# Patient Record
Sex: MALE | Race: WHITE
[De-identification: names, ages, dates, MRNs, and addresses within clinical notes are randomized per-mention and may not be internally consistent; named-entity substitution may affect disease eponyms.]

---

## 2020-07-07 ENCOUNTER — HOSPITAL ENCOUNTER (OUTPATIENT)
Dept: HOSPITAL 62 - OROUT | Age: 20
Discharge: HOME | End: 2020-07-07
Attending: ORTHOPAEDIC SURGERY
Payer: OTHER GOVERNMENT

## 2020-07-07 VITALS — SYSTOLIC BLOOD PRESSURE: 113 MMHG | DIASTOLIC BLOOD PRESSURE: 71 MMHG
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
## 2020-07-07 DIAGNOSIS — Z03.818: ICD-10-CM
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
## 2020-07-07 DIAGNOSIS — S62.022A: Primary | ICD-10-CM

## 2020-07-07 DIAGNOSIS — F17.210: ICD-10-CM

## 2020-07-07 PROCEDURE — 87635 SARS-COV-2 COVID-19 AMP PRB: CPT

## 2020-07-07 PROCEDURE — C9803 HOPD COVID-19 SPEC COLLECT: HCPCS

## 2020-07-07 PROCEDURE — 73110 X-RAY EXAM OF WRIST: CPT

## 2020-07-07 PROCEDURE — 25628 OPTX CARPL SCPHD FX INT FIXJ: CPT

## 2020-07-07 NOTE — RADIOLOGY REPORT (SQ)
EXAM DESCRIPTION:  WRIST LEFT 3 VIEWS; NO CHG FLUORO



IMAGES COMPLETED DATE/TIME:  7/7/2020 1:10 pm



REASON FOR STUDY:  ORIF LEFT SCAPHOID ASSISTED WITH FLUORO IN OR S62.022A  DISP FX OF MIDDLE THIRD OF
 NAVICULAR BONE OF L WRIS



COMPARISON:  None.



FLUOROSCOPY TIME:  2 minutes 12 seconds fluoroscopy time

8 images saved to PACS.



TECHNIQUE:  Intra-operative images acquired during surgical procedure to evaluate progress.

NUMBER OF IMAGES: 8 images



LIMITATIONS:  None.



FINDINGS:  Multiple images demonstrate ORIF in the scaphoid without evidence of hardware complication
.



IMPRESSION:  IMAGE(S) OBTAINED DURING PROCEDURE.



COMMENT:  Quality :  Final reports for procedures using fluoroscopy that document radiation exp
osure indices, or exposure time and number of fluorographic images (if radiation exposure indices are
 not available)

Please consult full operative report of the attending physician for description of the procedure.



TECHNICAL DOCUMENTATION:  JOB ID:  [id number]

 2011 [employer]- All Rights Reserved



Reading location - IP/workstation name: [email or web address]

## 2020-07-07 NOTE — RADIOLOGY REPORT (SQ)
EXAM DESCRIPTION:  WRIST LEFT 3 VIEWS; NO CHG FLUORO



IMAGES COMPLETED DATE/TIME:  7/7/2020 1:10 pm



REASON FOR STUDY:  ORIF LEFT SCAPHOID ASSISTED WITH FLUORO IN OR S62.022A  DISP FX OF MIDDLE THIRD OF
 NAVICULAR BONE OF L WRIS



COMPARISON:  None.



FLUOROSCOPY TIME:  2 minutes 12 seconds fluoroscopy time

8 images saved to PACS.



TECHNIQUE:  Intra-operative images acquired during surgical procedure to evaluate progress.

NUMBER OF IMAGES: 8 images



LIMITATIONS:  None.



FINDINGS:  Multiple images demonstrate ORIF in the scaphoid without evidence of hardware complication
.



IMPRESSION:  IMAGE(S) OBTAINED DURING PROCEDURE.



COMMENT:  Quality :  Final reports for procedures using fluoroscopy that document radiation exp
osure indices, or exposure time and number of fluorographic images (if radiation exposure indices are
 not available)

Please consult full operative report of the attending physician for description of the procedure.



TECHNICAL DOCUMENTATION:  JOB ID:  [id number]

 2011 [employer]- All Rights Reserved



Reading location - IP/workstation name: [email or web address]

## 2020-07-07 NOTE — OPERATIVE REPORT
Operative Report


DATE OF SURGERY: 07/07/20


PREOPERATIVE DIAGNOSIS: Displaced left scaphoid waist fracture


POSTOPERATIVE DIAGNOSIS: Same


OPERATION: Open reduction internal fixation left scaphoid fracture


SURGEON: JOVAN FREEMAN


ANESTHESIA: GA


COMPLICATIONS: 





None


ESTIMATED BLOOD LOSS: Minimal


PROCEDURE: 





Indication for above procedure:





20-year-old male who sustained a fall onto his outstretched left wrist.  Patient

had radiographs and later CT scan confirming displaced scaphoid fracture with 

humpback deformity.  Discussed treatment options with the patient including 

operative versus nonoperative intervention.  Risk and benefits were explained 

patient verbalized understanding and consented for surgical procedure.





Procedure In Detail:





Patient was seen and evaluated in the preoperative holding area.  The LEFT upper

extremity was initialized and marked.  Patient received 2g of Ancef IV for 

bacterial prophylaxis.  Patient was taken back to the operative room where 

transferred to the operative table and placed under general anesthesia.  Once 

they were adequately anesthetized a nonsterile tourniquet was placed on the 

upper extremity.  A surgical team debriefing was performed ensuring all 

instrumentation was available, the surgical procedure was discussed with 

possible concerns reviewed.  The upper extremity was prepped with chlorhexidine 

and alcohol and draped in a sterile fashion.   A timeout was done identifying 

correct patient, procedure and extremity everyone in attendance agree with this 

and verbalized no concerns. The extremity was exsanguinated the tourniquet was 

inflated to 250 mmHg.





Patient's wrist was flexed to 90 degrees and on oscillate a 0.045 K wire was 

placed across the radiolunate joint.  Wrist was then extended reducing the 

humpback deformity.  Dorsal skin incision was then utilized blunt dissection was

performed any peripheral veins were coagulated with bipolar cautery.  The third 

dorsal compartment was opened distally and EPL tendon retracted.  Fourth dorsal 

compartment was identified and retracted.  T-shaped capsulotomy was made along 

the scapholunate articulation confirmed with C arm fluoroscopy.  The proximal 

pole of the scaphoid was then identified along with the scapholunate ligament.  

There is no evidence of scapholunate ligament involvement.  





With the wrist slightly extended a K wire was placed across the fracture site to

obtain reduction and restore scaphoid height and reduce humpback deformity..  C 

arm fluoroscopy was obtained confirming restoration of scaphoid height and alig

nment.  A mini Acutrak K wire was then placed along the center-center position 

of the scaphoid perpendicular to the fracture.  Multiple views including 

supinated oblique, pronated oblique, AP and lateral views were obtained 

confirming appropriate placement of the K wire.  Once optimal position of the K 

wire was confirmed with acceptable reduction K wire was measured to be 24 mm and

thus a 20 mm screw would be chosen.





K wire was then advanced into the trapezium overdrilled with the appropriate 

size drill bit, proximal aspect was countersunk.  A 20 mm mini AccuTrack screw 

was then placed perpendicular to the fracture.  Fragmentary compression was 

obtained.  There is no evidence of screw penetration through the distal 

articular surface and the screw was adequately countersunk.  K wires were then 

removed.  C-arm fluoroscopy was obtained demonstrating acceptable reduction of 

the fracture there was mild residual humpback deformity.  Scaphoid did move as a

unit with live fluoroscopy.  No evidence of intra-articular screw protrusion.  

There is no evidence of scapholunate widening with radial and ulnar deviation.





Wound was copiously irrigated with normal saline.  Capsule was closed with i

nterrupted 4-0 FiberWire suture.  Deep soft tissues were closed with 3-0 Vicryl 

suture.  Skin was closed with running subcuticular 4-0 Monocryl reinforced with 

Dermabond and Steri-Strips.  Patient was placed in a thumb spica splint.


Tourniquet was deflated.  Patient had normal peripheral fusion.  Sponge, 

instrument needle counts were correct.  No Intra-Op complications patient was 

well stable to PACU.





Postop plan:





Patient follow in the office in 2 weeks at which point we will obtain 

radiographs and transition to thumb spica cast until union is confirmed.

## 2020-07-07 NOTE — DISCHARGE SUMMARY
Discharge Summary (SDC)





- Discharge


Final Diagnosis: 





Left scaphoid fracture


Date of Surgery: 07/07/20


Discharge Date: 07/07/20


Condition: Good


Treatment or Instructions: 








Schedule Follow Up w/ Dr. Lexx Newell @ [facility] to be seen 

in 10-14 days or as scheduled


[place]: [phone] 


[place]: [phone]


[place]: [phone] 





Ice and elevate





Keep splint clean/dry/intact, do not remove.





If your fingers become numb please unwrap the Ace wrap but leave the splint in 

place, if the sensation does not return within 30 minutes please return to the 

emergency department.





May begin finger range of motion attempting to make full fist.





Please use ibuprofen (Motrin or Advil) 600-800 mg every 8 hours as needed for 

pain or fever DO NOT TAKE w/ TORADOL may use once TORADOL complete.  You may 

also use acetaminophen (Tylenol) 1000 mg every 4-6 hours as needed for pain or 

fever.  Please be aware that many medications contain acetaminophen, do not 

exceed a total of 1000 mg of acetaminophen every 6 hours.  





If ibuprofen and acetaminophen are not sufficient for your pain you may take the

Percocet/Norco.  Please be aware that the Percocet/Norco does contain Tylenol.





Stool softener of choice when on pain medication.





USE OF OVER-THE-COUNTER IBUPROFEN:


     Ibuprofen (Advil, Nuprin, Medipren, Motrin IB) is a medication for fever 

and pain control.  In addition, it has anti- inflammatory effects which may be 

beneficial, especially in the treatment of injuries.


     It's best to take ibuprofen with food.  Persons with ulcer disease or 

allergy to aspirin should notify their physician of this before taking 

ibuprofen.


     Ibuprofen can be given every four to six hours, for a total of four doses 

daily.


     Age              Pain or fever dose          Antiinflammatory dose


     6-8 yr              200 mg (1 tab)                200 mg (1 tab)


     9-11 yr             200 mg (1 tab)                200-400 mg (1-2 tab)


     11-14 yr            200-400 mg (1-2 tab)         400 mg (2 tab)


     15-adult            400 mg (2 tab)                600 mg (3 tab)








ORAL NARCOTIC MEDICATION:


     You have been given a prescription for pain control.  This medication is a 

narcotic.  It's best taken with food, as nausea can result if taken on an empty 

stomach.


     Don't operate machinery or drive within six hours of taking this 

medication.  Do not combine this medicine with alcohol, or with any medication 

which can cause sedation (such as cold tablets or sleeping pills) unless you get

permission from the physician.


     Narcotics tend to cause constipation.  If possible, drink plenty of fluids 

and eat a diet high in fiber and fruits.





     Please be aware that prescription narcotics also have the potential for 

abuse.  People become addicted to these medications because of the general sense

of wellbeing that they induce.  This feeling along with a significant reduction 

in tension, anxiety, and aggression provides a stimulating seductive quality to 

these drugs.  Once your pain is under control, we encourage you to discard your 

unused narcotics.





Prescriptions: 


Oxycodone HCl/Acetaminophen [Percocet 7.5-325 mg Tablet] 1 tab PO Q6 PRN #25 tab


 PRN Reason: 


Discharge Diet: As Tolerated


Respiratory Treatments at Home: Deep Breathing/Coughing, Incentive Spirometer


Discharge Activity: No Driving, No Lifting Over 10 Pounds, No 

Lifting/Push/Pulling


Report the Following to Your Physician Immediately: Fever over 101 Degrees, 

Unusual Bleeding, Redness, Swelling, Warmth, Increased Soreness